# Patient Record
Sex: MALE | Race: WHITE
[De-identification: names, ages, dates, MRNs, and addresses within clinical notes are randomized per-mention and may not be internally consistent; named-entity substitution may affect disease eponyms.]

---

## 2020-06-23 ENCOUNTER — HOSPITAL ENCOUNTER (EMERGENCY)
Dept: HOSPITAL 11 - JP.ED | Age: 75
Discharge: HOME | End: 2020-06-23
Payer: MEDICARE

## 2020-06-23 DIAGNOSIS — T63.441A: Primary | ICD-10-CM

## 2020-06-23 DIAGNOSIS — L50.9: ICD-10-CM

## 2020-06-23 NOTE — EDM.PDOC
ED HPI GENERAL MEDICAL PROBLEM





- General


Stated Complaint: BEE STING


Time Seen by Provider: 06/23/20 22:07


Source of Information: Reports: Patient


History Limitations: Reports: No Limitations





- History of Present Illness


INITIAL COMMENTS - FREE TEXT/NARRATIVE: 





CC: Bee sting





74 year old patient to ER in private vehicle after swelling, itching, and small 

amount hives on left hand and forearm.  Was stung by yellow jacket about [place] 

[place] Indicated he took 50 mg Benadryl as 

instructed by neighbor. Denies tongue swelling, shortness or SOB. 


Onset: Today


Onset Date: 06/23/20


Onset Time: 12:00


Duration: Hour(s):, Constant


Location: Reports: Upper Extremity, Left


Severity: Mild


Improves with: Reports: None


Worsens with: Reports: None


Context: Reports: Other (Bee sting)


Associated Symptoms: Reports: Rash, Other (Hives on left fore arm)


Treatments PTA: Reports: Other Medication(s) (50 mg Benadryl at noon today)





ED ROS GENERAL





- Review of Systems


Review Of Systems: See Below


Constitutional: Reports: Other (Rash/hives on left fore arm )


HEENT: Reports: No Symptoms


Respiratory: Reports: No Symptoms


Cardiovascular: Reports: No Symptoms


Endocrine: Reports: No Symptoms


GI/Abdominal: Reports: No Symptoms


: Reports: No Symptoms


Musculoskeletal: Reports: No Symptoms


Skin: Reports: Rash, Erythema (Left forearm reddened with hive/rash )


Neurological: Reports: No Symptoms


Psychiatric: Reports: No Symptoms


Hematologic/Lymphatic: Reports: No Symptoms


Immunologic: Reports: No Symptoms





ED EXAM, GENERAL





- Physical Exam


Exam: See Below


Exam Limited By: No Limitations


General Appearance: Alert, WD/WN, No Apparent Distress


Eye Exam: Bilateral Eye: Normal Inspection, PERRL


Ears: Normal External Exam


Ear Exam: Bilateral Ear: Auricle Normal


Nose: Normal Inspection, No Blood


Throat/Mouth: Normal Inspection, Normal Lips, Normal Teeth, Normal Gums, Normal 

Voice, No Airway Compromise, Other (tongue normal size)


Head: Normocephalic


Neck: Normal Inspection, Full Range of Motion


Respiratory/Chest: No Respiratory Distress, Lungs Clear, Normal Breath Sounds, 

No Accessory Muscle Use


Cardiovascular: Regular Rate, Rhythm, No Edema, No Gallop, No JVD, No Murmur, No

Rub


GI/Abdominal: Normal Bowel Sounds, Soft, Non-Tender, No Distention


 (Male) Exam: Deferred


Rectal (Males) Exam: Deferred


Back Exam: Normal Inspection, Full Range of Motion


Extremities: Other (Hives noted to left forearem)


Neurological: Alert, Oriented, CN II-XII Intact, Normal Cognition, Normal Gait


Psychiatric: Normal Affect, Normal Mood


Skin Exam: Warm, Dry, Intact, Rash (irregular shaped hives noted to left 

forearm. No other rash noted in other extemities. )


Lymphatic: No Adenopathy





Course





- Vital Signs


Last Recorded V/S: 


                                Last Vital Signs











Temp  36.7 C   06/23/20 22:04


 


Pulse  62   06/23/20 22:04


 


Resp  16   06/23/20 22:04


 


BP  136/51 L  06/23/20 22:04


 


Pulse Ox  98   06/23/20 22:04














- Re-Assessments/Exams


Free Text/Narrative Re-Assessment/Exam: 





06/23/20 22:40


Discussed and reviewed bees stings and reactions


Discussed medications


Mr Slater agreed with plan of care 








Departure





- Departure


Time of Disposition: 22:23


Disposition: Home, Self-Care 01


Condition: Good


Clinical Impression: 


Bee sting reaction


Qualifiers:


 Encounter type: initial encounter 








- Discharge Information


*PRESCRIPTION DRUG MONITORING PROGRAM REVIEWED*: Not Applicable


*COPY OF PRESCRIPTION DRUG MONITORING REPORT IN PATIENT ELIZABETH: Not Applicable


Instructions:  Bee, Wasp, or Hornet Sting, Adult


Referrals: 


PCP,None [Primary Care Provider] - 


Care Plan Goals: 


Bee sting reaction:


Prednisone 20 mg orally twice a day for 5 days 


Benadryl 25 mg every 6 hours for 3-5 days as needed for itching and hives 


Follow up with provider as needed


Advise light activity for next 3 to 5 days








Return to ER if increased pain, tongue swelling, shortness of breath, hives, not

improved or any concerns 





Sepsis Event Note (ED)





- Focused Exam


Vital Signs: 


                                   Vital Signs











  Temp Pulse Resp BP Pulse Ox


 


 06/23/20 22:04  36.7 C  62  16  136/51 L  98














- Problem List & Annotations


(1) Bee sting reaction


SNOMED Code(s): 807800624, 778491715


   Code(s): T63.441A - TOXIC EFFECT OF VENOM OF BEES, ACCIDENTAL, INIT   Status:

Acute   Priority: High   Current Visit: Yes   


Qualifiers: 


   Encounter type: initial encounter 





- Problem List Review


Problem List Initiated/Reviewed/Updated: Yes





- Assessment/Plan


Plan: 





Bee sting reaction:


Prednisone 20 mg orally twice a day for 5 days 


Benadryl 25 mg every 6 hours for 3-5 days as needed for itching and hives 


Follow up with provider as needed


Advise light activity for next 3 to 5 days








Return to ER if increased pain, tongue swelling, shortness of breath, hives, not

improved or any concerns